# Patient Record
Sex: MALE | Race: WHITE
[De-identification: names, ages, dates, MRNs, and addresses within clinical notes are randomized per-mention and may not be internally consistent; named-entity substitution may affect disease eponyms.]

---

## 2019-04-07 ENCOUNTER — HOSPITAL ENCOUNTER (EMERGENCY)
Dept: HOSPITAL 61 - ER | Age: 34
Discharge: HOME | End: 2019-04-07
Payer: SELF-PAY

## 2019-04-07 VITALS — SYSTOLIC BLOOD PRESSURE: 144 MMHG | DIASTOLIC BLOOD PRESSURE: 85 MMHG

## 2019-04-07 VITALS — HEIGHT: 73 IN | WEIGHT: 196 LBS | BODY MASS INDEX: 25.98 KG/M2
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
## 2019-04-07 DIAGNOSIS — S39.012A: Primary | ICD-10-CM
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
## 2019-04-07 PROCEDURE — 72100 X-RAY EXAM L-S SPINE 2/3 VWS: CPT

## 2019-04-07 PROCEDURE — 99283 EMERGENCY DEPT VISIT LOW MDM: CPT

## 2019-04-07 NOTE — RAD
LUMBAR SPINE RADIOGRAPHS (AP and lateral views with a coned-down lateral 

view of the lumbosacral junction):

 

DATE:  4/7/2019 7:02 AM

 

INDICATION:  low back pain more on the right from injury at work yesterday

moving a pallet

 

COMPARISON:  None.

 

FINDINGS:  

Five non-rib bearing lumbar-type vertebral bodies are present. The normal 

lumbar lordosis is preserved. No listhesis. Vertebral body heights are 

normal.  There is no evidence of acute fracture. The disc spaces are 

maintained. The visualized soft tissues are unremarkable.

 

IMPRESSION:

No acute lumbar fracture or malalignment.

 

Electronically signed by: Isaac Pringle MD (4/7/2019 7:54 AM) 

[facility]
